# Patient Record
Sex: MALE | Race: AMERICAN INDIAN OR ALASKA NATIVE
[De-identification: names, ages, dates, MRNs, and addresses within clinical notes are randomized per-mention and may not be internally consistent; named-entity substitution may affect disease eponyms.]

---

## 2018-02-15 ENCOUNTER — HOSPITAL ENCOUNTER (EMERGENCY)
Dept: HOSPITAL 14 - H.ER | Age: 28
Discharge: HOME | End: 2018-02-15
Payer: COMMERCIAL

## 2018-02-15 VITALS
DIASTOLIC BLOOD PRESSURE: 75 MMHG | SYSTOLIC BLOOD PRESSURE: 129 MMHG | TEMPERATURE: 98.5 F | HEART RATE: 89 BPM | RESPIRATION RATE: 17 BRPM

## 2018-02-15 VITALS — OXYGEN SATURATION: 96 %

## 2018-02-15 DIAGNOSIS — J11.1: Primary | ICD-10-CM

## 2018-02-15 LAB
ALBUMIN SERPL-MCNC: 4.6 G/DL (ref 3.5–5)
ALBUMIN/GLOB SERPL: 1.3 {RATIO} (ref 1–2.1)
ALT SERPL-CCNC: 37 U/L (ref 21–72)
AST SERPL-CCNC: 33 U/L (ref 17–59)
BASOPHILS # BLD AUTO: 0 K/UL (ref 0–0.2)
BASOPHILS NFR BLD: 0.7 % (ref 0–2)
BUN SERPL-MCNC: 16 MG/DL (ref 9–20)
CALCIUM SERPL-MCNC: 9.1 MG/DL (ref 8.4–10.2)
EOSINOPHIL # BLD AUTO: 0.1 K/UL (ref 0–0.7)
EOSINOPHIL NFR BLD: 1.3 % (ref 0–4)
ERYTHROCYTE [DISTWIDTH] IN BLOOD BY AUTOMATED COUNT: 15.9 % (ref 11.5–14.5)
GFR NON-AFRICAN AMERICAN: > 60
HGB BLD-MCNC: 13.6 G/DL (ref 12–18)
LYMPHOCYTES # BLD AUTO: 0.7 K/UL (ref 1–4.3)
LYMPHOCYTES NFR BLD AUTO: 11.3 % (ref 20–40)
MCH RBC QN AUTO: 21.7 PG (ref 27–31)
MCHC RBC AUTO-ENTMCNC: 31.4 G/DL (ref 33–37)
MCV RBC AUTO: 69.2 FL (ref 80–94)
MONOCYTES # BLD: 1.1 K/UL (ref 0–0.8)
MONOCYTES NFR BLD: 17.3 % (ref 0–10)
NEUTROPHILS # BLD: 4.6 K/UL (ref 1.8–7)
NEUTROPHILS NFR BLD AUTO: 69.4 % (ref 50–75)
NRBC BLD AUTO-RTO: 0.2 % (ref 0–0)
PLATELET # BLD: 241 K/UL (ref 130–400)
PMV BLD AUTO: 9.7 FL (ref 7.2–11.7)
RBC # BLD AUTO: 6.27 MIL/UL (ref 4.4–5.9)
WBC # BLD AUTO: 6.6 K/UL (ref 4.8–10.8)

## 2018-02-15 PROCEDURE — 87040 BLOOD CULTURE FOR BACTERIA: CPT

## 2018-02-15 PROCEDURE — 85025 COMPLETE CBC W/AUTO DIFF WBC: CPT

## 2018-02-15 PROCEDURE — 99284 EMERGENCY DEPT VISIT MOD MDM: CPT

## 2018-02-15 PROCEDURE — 80053 COMPREHEN METABOLIC PANEL: CPT

## 2018-02-15 PROCEDURE — 71046 X-RAY EXAM CHEST 2 VIEWS: CPT

## 2018-02-15 NOTE — RAD
HISTORY:

fever, cough  



COMPARISON:

No prior.



TECHNIQUE:

Chest PA and lateral



FINDINGS:



LUNGS:

The interstitial markings are slightly increased and coarsened ; rule 

out sequela of reactive/inflammatory airway disease or viral illness.



PLEURA:

No significant pleural effusion identified. No pneumothorax apparent.



CARDIOVASCULAR:

Normal.



OSSEOUS STRUCTURES:

No significant abnormalities.



VISUALIZED UPPER ABDOMEN:

Normal.



OTHER FINDINGS:

None.



IMPRESSION:

Slight increased coarse interstitial markings; rule out sequela of 

reactive/inflammatory airway disease or viral illness.

## 2018-02-15 NOTE — ED PDOC
HPI: CCC, URI, Sore Throat


Time Seen by Provider: 02/15/18 14:03


Chief Complaint (Nursing): Fever


Chief Complaint (Provider): Flu-like symptoms


History Per: Patient


History/Exam Limitations: no limitations


Current Symptoms Are (Timing): Still Present


Sick Contacts (Context): None


Associated Symptoms: Fever, Myalgias


Additional Complaint(s): 


27 year old male, otherwise healthy, presents to the ER complaining of 

subjective fever, headache, cough, body aches, and generalized weakness since 

Tuesday afternoon, 2/13/18. Reports taking Advil with mild improvement, but did 

not take any today. No recent travel or sick contacts. Patient reports 

decreased appetite. Denies any nausea, vomiting, diarrhea, rash, ear pain, 

throat pain, abdominal pain, or chest pain. 





PMD: None  





Past Medical History


Reviewed: Historical Data, Nursing Documentation, Vital Signs


Vital Signs: 


 Last Vital Signs











Temp  98.5 F   02/15/18 17:18


 


Pulse  89   02/15/18 17:18


 


Resp  17   02/15/18 17:18


 


BP  129/75   02/15/18 17:18


 


Pulse Ox  99   02/15/18 17:18














- Medical History


PMH: No Chronic Diseases





- Surgical History


Surgical History: No Surg Hx





- Family History


Family History: States: Unknown Family Hx





- Social History


Current smoker - smoking cessation education provided: Yes


Alcohol: None


Drugs: Denies





- Home Medications


Home Medications: 


 Ambulatory Orders











 Medication  Instructions  Recorded


 


Ibuprofen [Motrin Tab] 600 mg PO QID PRN #20 tab 02/15/18


 


Oseltamivir Phosphate [Tamiflu] 75 mg PO BID #10 capsule 02/15/18


 


Promethazine DM [Phenergan DM 5 ml PO Q6 PRN #200 ml 02/15/18





Syrup]  














- Allergies


Allergies/Adverse Reactions: 


 Allergies











Allergy/AdvReac Type Severity Reaction Status Date / Time


 


No Known Allergies Allergy   Verified 02/15/18 13:50














Review of Systems


ROS Statement: Except As Marked, All Systems Reviewed And Found Negative


Constitutional: Positive for: Fever, Weakness, Other (Body aches)


ENT: Negative for: Ear Pain, Throat Pain


Cardiovascular: Negative for: Chest Pain


Respiratory: Positive for: Cough


Gastrointestinal: Negative for: Nausea, Vomiting, Abdominal Pain, Diarrhea


Skin: Negative for: Rash


Neurological: Positive for: Headache





Physical Exam





- Reviewed


Nursing Documentation Reviewed: Yes


Vital Signs Reviewed: Yes





- Physical Exam


Appears: Positive for: Non-toxic, No Acute Distress


Head Exam: Positive for: ATRAUMATIC, NORMAL INSPECTION, NORMOCEPHALIC


Skin: Positive for: Normal Color, Warm, Dry


Eye Exam: Positive for: EOMI, Normal appearance, PERRL


ENT: Positive for: Normal ENT Inspection


Neck: Positive for: Normal, Painless ROM


Cardiovascular/Chest: Positive for: Regular Rate, Rhythm.  Negative for: Murmur


Respiratory: Positive for: Normal Breath Sounds.  Negative for: Accessory 

Muscle Use, Rhonchi, Wheezing, Respiratory Distress


Gastrointestinal/Abdominal: Positive for: Normal Exam, Soft.  Negative for: 

Tenderness, Distended


Extremity: Positive for: Normal ROM.  Negative for: Deformity


Neurologic/Psych: Positive for: Alert, Oriented (x3).  Negative for: Motor/

Sensory Deficits





- Laboratory Results


Result Diagrams: 


 02/15/18 15:50





 02/15/18 15:50





- ECG


O2 Sat by Pulse Oximetry: 96 (RA)


Pulse Ox Interpretation: Normal





Medical Decision Making


Medical Decision Making: 





Initial Impression: Fever, Body aches, Likely viral illness





Time: 15:11


Plan:


* CMP


* CBC


* Blood culture


* Chest x-ray


* Motrin 600 mg PO


* Tylenol 650 mg PO


* IV fluids


* Reevaluation








15:24


CHEST X-RAY


FINDINGS:





LUNGS:


The interstitial markings are slightly increased and coarsened ; rule out 

sequela of reactive/inflammatory airway disease or viral illness.





PLEURA:


No significant pleural effusion identified. No pneumothorax apparent.





CARDIOVASCULAR:


Normal.





OSSEOUS STRUCTURES:


No significant abnormalities.





VISUALIZED UPPER ABDOMEN:


Normal.





OTHER FINDINGS:


None.





IMPRESSION:


Slight increased coarse interstitial markings; rule out sequela of reactive/

inflammatory airway disease or viral illness.





Labs reviewed, and are grossly normal. Patient treated with Tamiflu 75mg PO.





On re-evaluation, vital signs are stable. Patient is medically stable. Will d/c 

with prescription for Tamiflu, Promethazine, and Ibuprofen. Counseling was 

provided and all questions were answered regarding diagnosis and need for 

follow up with the clinic. There is agreement to discharge plan. Return if 

symptoms persist or worsen.





--------------------------------------------------------------------------------

-----------------


Scribe Attestation:   


Documented by Inez Barahona, acting as a scribe for Zoe Brown PA-C





Provider Scribe Attestation:


All medical record entries made by the Scribe were at my direction and 

personally dictated by me. I have reviewed the chart and agree that the record 

accurately reflects my personal performance of the history, physical exam, 

medical decision making, and the department course for this patient. I have 

also personally directed, reviewed, and agree with the discharge instructions 

and disposition.





Disposition





- Clinical Impression


Clinical Impression: 


 Fever, Influenza, Cough in adult patient, Body aches








- Patient ED Disposition


Is Patient to be Admitted: No


Counseled Patient/Family Regarding: Studies Performed, Diagnosis, Need For 

Followup, Rx Given





- Disposition


Referrals: 


[facility] [Outside]


Disposition: Routine/Home


Disposition Time: 17:05


Condition: STABLE


Prescriptions: 


Ibuprofen [Motrin Tab] 600 mg PO QID PRN #20 tab


 PRN Reason: fever, pain


Oseltamivir Phosphate [Tamiflu] 75 mg PO BID #10 capsule


Promethazine DM [Phenergan DM Syrup] 5 ml PO Q6 PRN #200 ml


 PRN Reason: Cough


Instructions:  Flu, Cough in Adults, Viral Upper Respiratory Infection, Adult (

DC), Fever, Adult (DC)


Forms:  [facility] (English), [facility] ED School/Work Excuse


Print Language: ENGLISH





- POA


Present On Arrival: None

## 2018-06-29 ENCOUNTER — HOSPITAL ENCOUNTER (EMERGENCY)
Dept: HOSPITAL 14 - H.ER | Age: 28
Discharge: HOME | End: 2018-06-29
Payer: COMMERCIAL

## 2018-06-29 VITALS
DIASTOLIC BLOOD PRESSURE: 73 MMHG | RESPIRATION RATE: 18 BRPM | HEART RATE: 92 BPM | TEMPERATURE: 99 F | SYSTOLIC BLOOD PRESSURE: 116 MMHG | OXYGEN SATURATION: 99 %

## 2018-06-29 DIAGNOSIS — F17.200: ICD-10-CM

## 2018-06-29 DIAGNOSIS — R10.11: Primary | ICD-10-CM

## 2018-06-29 LAB
ALBUMIN SERPL-MCNC: 4.6 G/DL (ref 3.5–5)
ALBUMIN/GLOB SERPL: 1.4 {RATIO} (ref 1–2.1)
ALT SERPL-CCNC: 29 U/L (ref 21–72)
APTT BLD: 35.2 SECONDS (ref 25.6–37.1)
AST SERPL-CCNC: 32 U/L (ref 17–59)
BASOPHILS # BLD AUTO: 0.1 K/UL (ref 0–0.2)
BASOPHILS NFR BLD: 0.8 % (ref 0–2)
BUN SERPL-MCNC: 18 MG/DL (ref 9–20)
CALCIUM SERPL-MCNC: 9.4 MG/DL (ref 8.4–10.2)
EOSINOPHIL # BLD AUTO: 0.2 K/UL (ref 0–0.7)
EOSINOPHIL NFR BLD: 3 % (ref 0–4)
ERYTHROCYTE [DISTWIDTH] IN BLOOD BY AUTOMATED COUNT: 15.6 % (ref 11.5–14.5)
GFR NON-AFRICAN AMERICAN: > 60
HGB BLD-MCNC: 13.6 G/DL (ref 12–18)
INR PPP: 1.1 (ref 0.9–1.2)
LIPASE SERPL-CCNC: 70 U/L (ref 23–300)
LYMPHOCYTES # BLD AUTO: 2.6 K/UL (ref 1–4.3)
LYMPHOCYTES NFR BLD AUTO: 31.9 % (ref 20–40)
MCH RBC QN AUTO: 22.2 PG (ref 27–31)
MCHC RBC AUTO-ENTMCNC: 31.6 G/DL (ref 33–37)
MCV RBC AUTO: 70.2 FL (ref 80–94)
MONOCYTES # BLD: 0.7 K/UL (ref 0–0.8)
MONOCYTES NFR BLD: 8.5 % (ref 0–10)
NEUTROPHILS # BLD: 4.6 K/UL (ref 1.8–7)
NEUTROPHILS NFR BLD AUTO: 55.8 % (ref 50–75)
NRBC BLD AUTO-RTO: 0.1 % (ref 0–0)
PLATELET # BLD: 288 K/UL (ref 130–400)
PMV BLD AUTO: 8.7 FL (ref 7.2–11.7)
PROTHROMBIN TIME: 12 SECONDS (ref 9.8–13.1)
RBC # BLD AUTO: 6.13 MIL/UL (ref 4.4–5.9)
WBC # BLD AUTO: 8.3 K/UL (ref 4.8–10.8)

## 2018-06-29 PROCEDURE — 76700 US EXAM ABDOM COMPLETE: CPT

## 2018-06-29 PROCEDURE — 85730 THROMBOPLASTIN TIME PARTIAL: CPT

## 2018-06-29 PROCEDURE — 99283 EMERGENCY DEPT VISIT LOW MDM: CPT

## 2018-06-29 PROCEDURE — 80053 COMPREHEN METABOLIC PANEL: CPT

## 2018-06-29 PROCEDURE — 83690 ASSAY OF LIPASE: CPT

## 2018-06-29 PROCEDURE — 74177 CT ABD & PELVIS W/CONTRAST: CPT

## 2018-06-29 PROCEDURE — 96374 THER/PROPH/DIAG INJ IV PUSH: CPT

## 2018-06-29 PROCEDURE — 83615 LACTATE (LD) (LDH) ENZYME: CPT

## 2018-06-29 PROCEDURE — 85610 PROTHROMBIN TIME: CPT

## 2018-06-29 PROCEDURE — 85025 COMPLETE CBC W/AUTO DIFF WBC: CPT

## 2018-06-29 NOTE — ED PDOC
HPI: Abdomen


Time Seen by Provider: 06/29/18 15:38


Chief Complaint (Nursing): Abdominal Pain


Chief Complaint (Provider): Abdominal Pain


History Per: Patient


History/Exam Limitations: no limitations


Onset/Duration Of Symptoms: Days (x1)


Current Symptoms Are (Timing): Still Present


Additional Complaint(s): 


27 year old male presents to the ED with constant right upper quadrant 

abdominal pain onset last night around 22:30 after eating. He notes taking 

Advil with some relief, but the pain returned today prompting the visit. 

Patient reports pain is worse when he takes a deep breath or goes over a bump 

in his truck. Otherwise, denies nausea, vomiting, diarrhea, constipation, fever

, chills, urinary symptoms, and back pain. 





PMD: none provided





Past Medical History


Reviewed: Historical Data, Nursing Documentation, Vital Signs


Vital Signs: 


 Last Vital Signs











Temp  99 F   06/29/18 15:28


 


Pulse  92 H  06/29/18 15:28


 


Resp  18   06/29/18 15:28


 


BP  116/73   06/29/18 15:28


 


Pulse Ox  99   06/29/18 21:12














- Medical History


PMH: No Chronic Diseases





- Surgical History


Surgical History: No Surg Hx





- Family History


Family History: States: No Known Family Hx





- Social History


Current smoker - smoking cessation education provided: Yes


Alcohol: Social


Drugs: Denies





- Home Medications


Home Medications: 


 Ambulatory Orders











 Medication  Instructions  Recorded


 


Ibuprofen [Motrin Tab] 600 mg PO QID PRN #20 tab 02/15/18


 


Oseltamivir Phosphate [Tamiflu] 75 mg PO BID #10 capsule 02/15/18


 


Promethazine DM [Phenergan DM 5 ml PO Q6 PRN #200 ml 02/15/18





Syrup]  


 


Ibuprofen [Motrin Tab] 600 mg PO Q8 PRN #30 tab 06/29/18


 


Lidocaine [Lidocaine Pain Relief] 1 each TP Q8H PRN #10 adh..patch 06/29/18














- Allergies


Allergies/Adverse Reactions: 


 Allergies











Allergy/AdvReac Type Severity Reaction Status Date / Time


 


No Known Allergies Allergy   Verified 02/15/18 13:50














Review of Systems


ROS Statement: Except As Marked, All Systems Reviewed And Found Negative


Constitutional: Negative for: Fever, Chills


Gastrointestinal: Positive for: Abdominal Pain (RUQ).  Negative for: Nausea, 

Vomiting, Diarrhea, Constipation


Genitourinary Male: Negative for: Dysuria, Frequency, Incontinence


Musculoskeletal: Negative for: Back Pain





Physical Exam





- Reviewed


Nursing Documentation Reviewed: Yes


Vital Signs Reviewed: Yes





- Physical Exam


Appears: Positive for: Non-toxic, In Acute Distress (mild painful)


Head Exam: Positive for: ATRAUMATIC, NORMOCEPHALIC


Skin: Positive for: Warm, Dry


Eye Exam: Positive for: EOMI, PERRL


ENT: Negative for: Pharyngeal Erythema, Tonsillar Exudate


Neck: Positive for: Painless ROM, Supple


Cardiovascular/Chest: Positive for: Regular Rate, Rhythm, Chest Non Tender.  

Negative for: Murmur


Respiratory: Positive for: Normal Breath Sounds.  Negative for: Wheezing


Gastrointestinal/Abdominal: Positive for: Tenderness (to palpation of RUQ ), 

Guarding (voluntary), Other (positive Doe's sign; negative McBurney's point 

tenderness).  Negative for: Distended, Rebound


Back: Positive for: Normal Inspection.  Negative for: L CVA Tenderness, R CVA 

Tenderness, Decreased ROM


Extremity: Positive for: Normal ROM.  Negative for: Deformity


Lymphatic: Negative for: Adenopathy


Neurologic/Psych: Positive for: Alert.  Negative for: Motor/Sensory Deficits





- Laboratory Results


Result Diagrams: 


 06/29/18 16:35





 06/29/18 16:35





- ECG


O2 Sat by Pulse Oximetry: 99 (RA)


Pulse Ox Interpretation: Normal





Medical Decision Making


Medical Decision Making: 


Initial Impression: RUQ pain


Ddx includes but is not limited to: cholelithiasis, cholecystitis, pancreatitis

, peptic ulcer disease, hepatitis 





Time: 15:48


Initial Plan:


--US Abdomen complete


--Normal Saline 


--Toradol 15mg IVP


--PT / PTT


--CBC with differential


--Urine dipstick


--Lipase


--LDH


--CMP





17:03


US Abdomen


FINDINGS:


LIVER:


Measures 15.1 cm.  Normal echogenicity of the liver parenchyma. No mass. No 

intrahepatic bile duct dilatation.


GALLBLADDER:


There are no gallstones, wall thickening or pericholecystic fluid.  The 

sonographic Doe's sign is negative. 


COMMON BILE DUCT:


Measures 3.0 mm. No stones. No dilatation.


PANCREAS:


Unremarkable as visualized. No mass. No ductal dilatation.


RIGHT KIDNEY:


Measures 11.1cm. Normal echogenicity. No calculus, mass, or hydronephrosis.


LEFT KIDNEY:


Measures 11.1cm. Normal echogenicity. No calculus, mass, or hydronephrosis.


SPLEEN:


Normal in size and contour. No mass.


AORTA:


No aneurysmal dilatation. 


IVC:


Unremarkable. 


OTHER FINDINGS:


None. 


IMPRESSION:


No cholelithiasis or biliary dilatation


 


CT ordered given severity of pain and normal US.


 


20:57


CT Abd Pelvis


FINDINGS:


Lung bases: Heart size is normal. There is dependent airspace disease in both 

lung bases. There are no effusions.


ABDOMEN:


Liver: unremarkable


Gallbladder and bile ducts: unremarkable


Pancreas: unremarkable


Spleen: unremarkable


Adrenals: unremarkable


Kidneys and ureters: unremarkable


Stomach and bowel: Stomach is incompletely distended which accentuates the 

gastric wall. Duodenum is mildly dilated with wall thickening. Bowel rotation 

is normal. Portions of the small bowel are mildly dilated with fluid and air.. 

There is no obstruction. Ileocecal region is unremarkable.


Appendix and terminal ileum are unremarkable.There is contrast and stool in 

much of the colon.


PELVIS:


Appendix: See stomach and bowel


Bladder: unremarkable


Reproductive: Seminal vesicles and prostate are unremarkable.


ABDOMEN and PELVIS:


Intraperitoneal space: There is no free air or free fluid.


Bones/joints: There are no acute osseous abnormalities.


Soft tissues: unremarkable


Vasculature: There are multiple phleboliths. Vascular structures are 

unremarkable.


Lymph nodes: There is shotty adenopathy.


IMPRESSION: 


Ileus with possible enteritis; no acute solid visceral abnormality, shotty 

mesenteric


adenopathy


Additional nonemergent findings as described above.





21:05 While in the ER, patient ate a sandwich and drank soda. He reports no 

increasing pain, nausea, vomiting, or new symptoms. Patient is eager to be 

discharged. Labs were unremarkable. All results discussed with patient, as well 

as his plan of care to use NSAIDs and topical lidocaine and rest from physical 

labor.





-------------------------------------------------------------------------


Scribe Attestation:


Documented by Aziza Castaneda, acting as a scribe for Binta Ray MD.





Provider Scribe Attestation:


All medical entries made by the Scribe were at my direction and personally 

dictated by me. I have reviewed the chart and agree that the record accurately 

reflects my personal performance of the history, physical exam, medical 

decision making, and the department course for this patient. I have also 

personally directed, reviewed, and agree with the discharge instructions and 

disposition.





Disposition





- Clinical Impression


Clinical Impression: 


 Abdominal pain





Counseled Patient/Family Regarding: Studies Performed, Diagnosis, Need For 

Followup, Rx Given





- Disposition


Referrals: 


[facility] [Outside]


[facility] [place] [Outside]


Disposition: Routine/Home


Disposition Time: 21:00


Condition: STABLE


Additional Instructions: 


FOLLOW UP WITH A PRIMARY CARE PHYSICIAN OR [facility] IN 48-72 HOURS FOR 

REEVALUATION


Prescriptions: 


Ibuprofen [Motrin Tab] 600 mg PO Q8 PRN #30 tab


 PRN Reason: Pain, Moderate (4-7)


Lidocaine [Lidocaine Pain Relief] 1 each TP Q8H PRN #10 adh..patch


 PRN Reason: Pain


Instructions:  Abdominal Muscle Strain, Acute Abdomen (Belly Pain), Adult (DC)


Forms:  [facility] ED School/Work Excuse

## 2018-06-29 NOTE — US
HISTORY:

RUQ pain



COMPARISON:

None.



TECHNIQUE:

Grayscale imaging was performed.



FINDINGS:



LIVER:

Measures 15.1 cm.  Normal echogenicity of the liver parenchyma. No 

mass. No intrahepatic bile duct dilatation.



GALLBLADDER:

There are no gallstones, wall thickening or pericholecystic fluid.  

The sonographic Doe's sign is negative. 



COMMON BILE DUCT:

Measures 3.0 mm. No stones. No dilatation.



PANCREAS:

Unremarkable as visualized. No mass. No ductal dilatation.



RIGHT KIDNEY:

Measures 11.1cm. Normal echogenicity. No calculus, mass, or 

hydronephrosis.



LEFT KIDNEY:

Measures 11.1cm. Normal echogenicity. No calculus, mass, or 

hydronephrosis.



SPLEEN:

Normal in size and contour. No mass.



AORTA:

No aneurysmal dilatation. 



IVC:

Unremarkable. 



OTHER FINDINGS:

None. 



IMPRESSION:

No cholelithiasis or biliary dilatation.

## 2018-06-29 NOTE — CT
EXAM:

  CT Abdomen and Pelvis With Intravenous Contrast



EXAM DATE/TIME:

  6/29/2018 5:08 PM



CLINICAL HISTORY:

  27 years old, male; Pain; Abdominal pain; Localized; Right upper quadrant 

(ruq); Additional info: Severe ruq pain



TECHNIQUE:

  Axial computed tomography images of the abdomen and pelvis with intravenous 

contrast.  All CT scans at this facility use at least one of these dose 

optimization techniques: automated exposure control; mA and/or kV adjustment 

per patient size (includes targeted exams where dose is matched to clinical 

indication); or iterative reconstruction.

  Coronal and sagittal reformatted images were created and reviewed.



COMPARISON:

  There are no prior studies for comparison.



FINDINGS:

  Lung bases:  Heart size is normal. There is dependent airspace disease in 

both lung bases. There are no effusions.



 ABDOMEN:

  Liver:  unremarkable

  Gallbladder and bile ducts:  unremarkable

  Pancreas:  unremarkable

  Spleen:  unremarkable

  Adrenals:  unremarkable

  Kidneys and ureters:  unremarkable

  Stomach and bowel:  Stomach is incompletely distended which accentuates the 

gastric wall. Duodenum is mildly dilated with wall thickening. Bowel rotation 

is normal. Portions of the small bowel are mildly dilated with fluid and air.. 

There is no obstruction. Ileocecal region is unremarkable. Appendix and 

terminal ileum are unremarkable.There is contrast and stool in much of the 

colon. 



 PELVIS:

  Appendix:  See stomach and bowel

  Bladder:  unremarkable

  Reproductive:  Seminal vesicles and prostate are unremarkable.



 ABDOMEN and PELVIS:

  Intraperitoneal space:  There is no free air or free fluid.

  Bones/joints:  There are no acute osseous abnormalities.

  Soft tissues:  unremarkable

  Vasculature:  There are multiple phleboliths.  Vascular structures are 

unremarkable.

  Lymph nodes:  There is shotty adenopathy.



IMPRESSION:  Ileus with possible enteritis; no acute solid visceral 

abnormality, shotty mesenteric adenopathy



Additional nonemergent findings as described above.